# Patient Record
Sex: MALE | Race: BLACK OR AFRICAN AMERICAN | Employment: FULL TIME | URBAN - METROPOLITAN AREA
[De-identification: names, ages, dates, MRNs, and addresses within clinical notes are randomized per-mention and may not be internally consistent; named-entity substitution may affect disease eponyms.]

---

## 2019-10-02 ENCOUNTER — OFFICE VISIT (OUTPATIENT)
Dept: CARDIOLOGY CLINIC | Age: 39
End: 2019-10-02

## 2019-10-02 VITALS
OXYGEN SATURATION: 98 % | WEIGHT: 293 LBS | BODY MASS INDEX: 44.41 KG/M2 | HEIGHT: 68 IN | SYSTOLIC BLOOD PRESSURE: 130 MMHG | DIASTOLIC BLOOD PRESSURE: 92 MMHG | HEART RATE: 84 BPM

## 2019-10-02 DIAGNOSIS — E66.01 OBESITY, MORBID (HCC): ICD-10-CM

## 2019-10-02 DIAGNOSIS — R03.0 BORDERLINE HYPERTENSION: ICD-10-CM

## 2019-10-02 DIAGNOSIS — R94.31 ABNORMAL EKG: ICD-10-CM

## 2019-10-02 DIAGNOSIS — I49.3 PVC (PREMATURE VENTRICULAR CONTRACTION): Primary | ICD-10-CM

## 2019-10-02 RX ORDER — ERGOCALCIFEROL 1.25 MG/1
50000 CAPSULE ORAL
COMMUNITY

## 2019-10-02 RX ORDER — ZOLPIDEM TARTRATE 10 MG/1
TABLET ORAL
COMMUNITY

## 2019-10-02 RX ORDER — MONTELUKAST SODIUM 10 MG/1
10 TABLET ORAL DAILY
COMMUNITY

## 2019-10-02 NOTE — PATIENT INSTRUCTIONS
Echo for pvcs  48 hour holter for pvcs  Follow up as needed if testing normal  If you have not heard from the central scheduler to schedule your testing in 48 hours, please call [phone].

## 2019-10-02 NOTE — PROGRESS NOTES
Antonina Bird presents today for   Chief Complaint   Patient presents with    New Patient     referred by PCP for abn ekg with afib - no cardiac complaints        Angusgrace Whelan preferred language for health care discussion is english/other. Is someone accompanying this pt? no    Is the patient using any DME equipment during [address]? no    Depression Screening:  3 most recent PHQ Screens 10/2/2019   Little interest or pleasure in doing things Not at all   Feeling down, depressed, irritable, or hopeless Not at all   Total Score PHQ 2 0       Learning Assessment:  Learning Assessment 10/2/2019   PRIMARY LEARNER Patient   PRIMARY LANGUAGE ENGLISH   LEARNER PREFERENCE PRIMARY DEMONSTRATION   ANSWERED BY Patient   RELATIONSHIP SELF       Abuse Screening:  No flowsheet data found. Fall Risk  No flowsheet data found. Pt currently taking Anticoagulant therapy? no    Coordination of Care:  1. Have you been to the ER, urgent care clinic since your last visit? Hospitalized since your last visit? no    2. Have you seen or consulted any other health care providers outside of the [address] [place] [address] since your last visit? Include any pap smears or colon screening.  no

## 2019-10-02 NOTE — PROGRESS NOTES
HISTORY OF PRESENT ILLNESS  Mary Avitia is a 35 y.o. male. HPI    Patient presents for a new office visit. He was referred here for an abnormal EKG and frequent PVCs and PACs which were seen on an EKG at his PCPs office last month. Patient reports he was there for routine visit, was discovered to have an irregular heart rhythm, so the EKG was performed. He states his biggest issue has been insomnia. He denies any heart palpitations, dizziness or syncope. No major change in his activity level over the past 12 months. Never expressed any chest pain or shortness of breath. No leg swelling, orthopnea, PND, or claudication. He has noted progressive weight gain over the past few years. He denies ever having any cardiac testing or EKGs in the past.    Past Medical History:   Diagnosis Date    Allergic rhinitis     Borderline hypertension     Insomnia      Current Outpatient Medications   Medication Sig Dispense Refill    ergocalciferol (VITAMIN D2) 50,000 unit capsule Take 50,000 Units by mouth.  zolpidem (AMBIEN) 10 mg tablet Take  by mouth nightly as needed for Sleep.  montelukast (SINGULAIR) 10 mg tablet Take 10 mg by mouth daily. No Known Allergies     Social History     Tobacco Use    Smoking status: Never Smoker    Smokeless tobacco: Never Used   Substance Use Topics    Alcohol use: No    Drug use: No     Family History   Problem Relation Age of Onset    No Known Problems Mother     No Known Problems Father     Cancer Neg Hx     Diabetes Neg Hx     Heart Disease Neg Hx     Hypertension Neg Hx     Stroke Neg Hx            Review of Systems   Constitutional: Negative for chills, fever and weight loss. HENT: Negative for nosebleeds. Eyes: Negative for blurred vision and double vision. Respiratory: Negative for cough, shortness of breath and wheezing. Cardiovascular: Negative for chest pain, palpitations, orthopnea, claudication, leg swelling and PND. Gastrointestinal: Negative for abdominal pain, heartburn, nausea and vomiting. Genitourinary: Negative for dysuria and hematuria. Musculoskeletal: Negative for falls and myalgias. Skin: Negative for rash. Neurological: Negative for dizziness, focal weakness and headaches. Endo/Heme/Allergies: Does not bruise/bleed easily. Psychiatric/Behavioral: Negative for substance abuse. Visit Vitals  BP (!) 130/92 (BP 1 Location: Left arm, BP Patient Position: Sitting)   Pulse 84   Ht 5' 7.5\" (1.715 m)   Wt 132.9 kg (293 lb)   SpO2 98%   BMI 45.21 kg/m²       Physical Exam   Constitutional: He is oriented to person, place, and time. He appears well-developed and well-nourished. HENT:   Head: Normocephalic and atraumatic. Eyes: Conjunctivae are normal.   Neck: Neck supple. No JVD present. Carotid bruit is not present. Cardiovascular: Normal rate, regular rhythm, S1 normal, S2 normal and normal pulses. Exam reveals distant heart sounds. Exam reveals no gallop and no S3. No murmur heard. Pulmonary/Chest: Breath sounds normal. He has no wheezes. He has no rales. Abdominal: Soft. Bowel sounds are normal. There is no tenderness. Musculoskeletal: He exhibits no edema, tenderness or deformity. Neurological: He is alert and oriented to person, place, and time. Skin: Skin is warm and dry. Psychiatric: He has a normal mood and affect. His behavior is normal. Thought content normal.     EKG: Underlying normal sinus rhythm with frequent monomorphic PVCs and occasional APCs. Normal axis, normal QTc interval, borderline left atrial enlargement, no ST or T wave changes concerning for ischemia. ASSESSMENT and PLAN    Frequent PVCs. Patient is asymptomatic. This was likely an incidental finding on exam last month at his PCPs office.   I would however like to evaluate for any structural heart disease with an echocardiogram.  Also like to assess for any additional arrhythmias with a 48-hour Holter monitor. Borderline hypertension. Patient reports this has been present in the past.  He is never been started any medications. I have asked him to start with lifestyle modification with dietary changes and weight loss to see if his blood pressure will improve. I will defer starting any medications to his PCP. Morbid obesity. Patient reports a gradual weight gain over the past few years. He was encouraged to try lose much weight as possible lifestyle modification. As long as his cardiac testing is unremarkable, patient can follow-up in the future as needed.

## 2022-03-19 PROBLEM — I49.3 PVC (PREMATURE VENTRICULAR CONTRACTION): Status: ACTIVE | Noted: 2019-10-02

## 2022-03-19 PROBLEM — E66.01 OBESITY, MORBID (HCC): Status: ACTIVE | Noted: 2019-10-02

## 2022-03-19 PROBLEM — R03.0 BORDERLINE HYPERTENSION: Status: ACTIVE | Noted: 2019-10-02

## 2022-11-21 ENCOUNTER — TRANSCRIBE ORDER (OUTPATIENT)
Dept: SCHEDULING | Age: 42
End: 2022-11-21

## 2022-11-21 DIAGNOSIS — R05.3 PERSISTENT DRY COUGH: Primary | ICD-10-CM

## 2022-11-29 ENCOUNTER — HOSPITAL ENCOUNTER (OUTPATIENT)
Dept: CT IMAGING | Age: 42
Discharge: HOME | End: 2022-11-29
Attending: FAMILY MEDICINE
Payer: COMMERCIAL

## 2022-11-29 DIAGNOSIS — R05.3 PERSISTENT DRY COUGH: ICD-10-CM

## 2022-11-29 PROCEDURE — 71260 CT THORAX DX C+: CPT

## 2022-11-29 PROCEDURE — 74011000636 HC RX REV CODE- 636

## 2022-11-29 RX ADMIN — IOPAMIDOL 100 ML: 612 INJECTION, SOLUTION INTRAVENOUS at 17:12

## 2023-05-25 RX ORDER — ZOLPIDEM TARTRATE 10 MG/1
TABLET ORAL
COMMUNITY

## 2023-05-25 RX ORDER — ERGOCALCIFEROL 1.25 MG/1
50000 CAPSULE ORAL
COMMUNITY

## 2023-05-25 RX ORDER — MONTELUKAST SODIUM 10 MG/1
10 TABLET ORAL DAILY
COMMUNITY